# Patient Record
Sex: FEMALE | Race: BLACK OR AFRICAN AMERICAN | NOT HISPANIC OR LATINO | Employment: STUDENT | URBAN - METROPOLITAN AREA
[De-identification: names, ages, dates, MRNs, and addresses within clinical notes are randomized per-mention and may not be internally consistent; named-entity substitution may affect disease eponyms.]

---
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
## 2024-03-11 NOTE — PROGRESS NOTES
"Subjective:       Patient ID: Khloe Aguiar is a 12 y.o. female.    Chief Complaint: Tonsil stones and Enlarged tonsils    HPI     Khloe is a 12  y.o. 10  m.o. female who presents for consult for enlarged tonsils reported by her dentist 3 months ago. Per patients mother, pt started having symptoms of difficulty breathing at night, snoring, and sleep disturbance for about a year ago. These symptoms were episodic and mild. About 2 months ago, pt started noticing worsening breath and "stones" on her tonsils. She has been using tooth picks to pick off these stones with benefit for her bad breath. Pt denies pain with swallowing, recent infections. Pt has not had to miss school for these issues.       The patient has been Strep positive 0 times . The patient has been treated with the following antibiotics : no recent courses .        Review of Systems   Constitutional: Negative for chills, fever and unexpected weight change.   HENT: Negative for facial swelling, hearing loss and trouble swallowing.    Eyes: Negative for visual disturbance.   Respiratory: Negative for wheezing and stridor.    Cardiovascular: Negative for chest pain.        No CHD   Gastrointestinal: Negative for nausea and vomiting.        Neg for GERD   Genitourinary: Negative for enuresis.        Neg for congenital abn   Musculoskeletal: Negative.  Negative for arthralgias and myalgias.   Skin: Negative for rash.   Neurological: Negative for seizures and speech difficulty.   Hematological: Negative for adenopathy. Does not bruise/bleed easily.   Psychiatric/Behavioral: Negative for behavioral problems.   All other systems reviewed and are negative.        (Peds Addendum)    PMH: Gestation/: Term, well child            G&D: Nl             Med/Surg/Accidents:    See ROS                                                  CV: no congenital abn                                                    Pulm: no asthma, no chronic diseases                        "                                FH:  Bleeding disorders:                         none         MH/anesthetic problems:                 none                  Sickle Cell:                                      none         OM/HL:                                           none         Allergy/Asthma:                              none    SH:  Nursery/School:                              5  - d/wk          Tobacco Exposure:                           0            Objective:      Physical Exam   Constitutional: She is oriented to person, place, and time. She appears well-developed and well-nourished. No distress.   HENT:   Head: Normocephalic.   Right Ear: Tympanic membrane, external ear and ear canal normal. No middle ear effusion.   Left Ear: Tympanic membrane, external ear and ear canal normal.  No middle ear effusion.   Nose: Nose normal. No nasal deformity.   Mouth/Throat: Uvula is midline, oropharynx is clear and moist and mucous membranes are normal. Tonsils are 3+ on the right. Tonsils are 3+ on the left.       Eyes: Conjunctivae, EOM and lids are normal. Pupils are equal, round, and reactive to light.   Neck: Trachea normal and normal range of motion. No thyroid mass present.   Cardiovascular: Normal rate and regular rhythm.    Pulmonary/Chest: Effort normal and breath sounds normal. No respiratory distress.   Musculoskeletal: Normal range of motion.   Lymphadenopathy:     She has no cervical adenopathy.   Neurological: She is alert and oriented to person, place, and time. No cranial nerve deficit.   Skin: Skin is warm. No rash noted.   Psychiatric: She has a normal mood and affect. Her speech is normal and behavior is normal. Thought content normal.       Assessment:       1. Tonsillith    2. Halitosis    3. Sore throat    4. Snoring    5. Tonsillar hypertrophy        Plan:       1. TA    2 h/h/sickle    3. Consult requested by:  Debbie Ng MD (Inactive)        CXR negative - No CHF, No cardiomegaly, No pleural effusions

## (undated) DEVICE — SEE MEDLINE ITEM 157117

## (undated) DEVICE — SEE MEDLINE ITEM 152496

## (undated) DEVICE — CATH ALL PUR URTHL RR 10FR

## (undated) DEVICE — BLADE RED 40 ADENOID

## (undated) DEVICE — SUCTION COAGULATOR 10FR 6IN

## (undated) DEVICE — PENCIL ROCKER SWITCH 10FT CORD

## (undated) DEVICE — PACK TONSIL CUSTOM

## (undated) DEVICE — ELECTRODE REM PLYHSV RETURN 9